# Patient Record
Sex: MALE | Race: WHITE | Employment: OTHER | URBAN - METROPOLITAN AREA
[De-identification: names, ages, dates, MRNs, and addresses within clinical notes are randomized per-mention and may not be internally consistent; named-entity substitution may affect disease eponyms.]

---

## 2021-09-16 ENCOUNTER — HOSPITAL ENCOUNTER (OUTPATIENT)
Dept: PHYSICAL THERAPY | Age: 81
Discharge: HOME | End: 2021-09-16
Payer: MEDICARE

## 2021-09-16 DIAGNOSIS — M75.102 NON-TRAUMATIC ROTATOR CUFF TEAR, LEFT: ICD-10-CM

## 2021-09-16 PROCEDURE — 97110 THERAPEUTIC EXERCISES: CPT

## 2021-09-16 PROCEDURE — 97161 PT EVAL LOW COMPLEX 20 MIN: CPT

## 2021-09-16 NOTE — THERAPY EVALUATION
James Willson  : 1940  Primary: Luis Jones Medicare Advantage  Secondary:  [address] at [facility]  [address] [place], [address] [place] [address], [address]  [phone]   [phone]       OUTPATIENT PHYSICAL THERAPY:Initial Assessment 2021    ICD-10: Treatment Diagnosis: Non-traumatic rotator cuff tear, left [M75.102]                 Treatment Diagnosis 2: Left shoulder pain [M25.512]                 Treatment Diagnosis 3: Muscle weakness [M62.81]   Precautions: Hypertension  Allergies: Patient has no known allergies. TREATMENT PLAN:  Effective Dates: 2021 TO 2021 (60 days). Frequency/Duration: 1 time a week for 60 Day(s) MEDICAL/REFERRING DIAGNOSIS:  Non-traumatic rotator cuff tear, left [M75.102]   DATE OF ONSET: 2021   REFERRING PHYSICIAN: Santi Mckeon MD MD Orders: Evaluate and Treat   Return MD Appointment: None scheduled      INITIAL ASSESSMENT:  Mr. Marcus Li is a 80 y.o. male presenting to physical therapy with complaints of left shoulder pain first beginning in 2019 after experiencing bicep rupture and complete rotator cuff tear. He was a surgical candidate but reports his surgery was cancelled because he was still able to function fully without pain or limitations. Over the last couple months he reports the pain has worsened dramatically and he can no longer golf, perform daily activities or even sleep without pain. He has received an injection to his shoulder recently which has decreased his pain significantly. His main goals are to return to golfing, sleeping, and daily life without pain and to avoid surgery. Patient presents with increased pain, decreased strength, decreased ROM, decreased flexibility, impaired posture, impaired overhead reach, impaired transfer ability, decreased activity tolerance, and overall impaired functional mobility.  Patient is a good candidate for skilled physical therapy interventions to include manual therapy, therapeutic exercise, balance training, gait training, transfer training, postural re-education, body mechanics training, and pain modalities as needed. PROBLEM LIST (Impacting functional limitations):  1. Decreased Strength  2. Decreased ADL/Functional Activities  3. Increased Pain  4. Decreased Activity Tolerance  5. Decreased Flexibility/Joint Mobility  6. Decreased [place] with Home Exercise Program INTERVENTIONS PLANNED: (Treatment may consist of any combination of the following)  1. Cold  2. Cryotherapy  3. Family Education  4. Heat  5. Home Exercise Program (HEP)  6. Manual Therapy  7. Neuromuscular Re-education/Strengthening  8. Range of Motion (ROM)  9. Therapeutic Activites  10. Therapeutic Exercise/Strengthening  11. Transfer Training     GOALS: (Goals have been discussed and agreed upon with patient.)  Short-Term Functional Goals: Time Frame: 9/16/2021 to 10/14/2021  1. Patient demonstrates independence with home exercise program without verbal cueing provided by therapist.   2. Patient will sleep undisturbed due to pain. 3. Patient will demonstrate improved upright posture with scapular awareness. Discharge Goals: Time Frame: 9/16/2021 to 11/16/2021  1. Patient will demonstrate L shoulder external and internal rotation equal to the R.  2. Patient will demonstrate L shoulder ER strength of 4+/5 or better without pain. 3. Patient will return to sleeping and performing daily activities without pain. 4. Patient will return to golf with only mild pain and modifications. 5. Patient will improve Disability of the Arm, Shoulder, and Hand score to 16/55 from 54/55. Outcome Measure: Tool Used: Disabilities of the Arm, Shoulder and Hand (DASH) Questionnaire - Quick Version  Score:  Initial: 34/55  Most Recent: X/55 (Date: -- )   Interpretation of Score: The DASH is designed to measure the activities of daily living in person's with upper extremity dysfunction or pain. Each section is scored on a 1-5 scale, 5 representing the greatest disability. The scores of each section are added together for a total score of 55. Medical Necessity:   · Patient is expected to demonstrate progress in strength, range of motion and functional technique to decrease pain and improve ability to perform ADLs. · Skilled intervention continues to be required due to need for carefully progressed exercise program tailored to patient impairments. Reason for Services/Other Comments:  · Patient continues to require skilled intervention due to need for carefully progressed exercise program, manual therapy and plan of care adjustments tailored to patient needs. Total Evaluation Duration: 25 minutes    Rehabilitation Potential For Stated Goals: Good  Regarding Vane Leigh [facility]'s therapy, I certify that the treatment plan above will be carried out by a therapist or under their direction. Thank you for this referral,  Jen Caceres PT     Referring Physician Signature: Estela Bhatti MD _______________________________ Date _____________             PAIN/SUBJECTIVE:    Initial: Pain Intensity 1: 6  Pain Location 1: Shoulder  Pain Orientation 1: Left, Posterior, Anterior, Lateral  Post Session:  6/10    HISTORY:    History of Injury/Illness (Reason for Referral):  Mr. Vignesh Pete is a 80 y.o. male presenting to physical therapy with complaints of left shoulder pain first beginning in 2019 after experiencing bicep rupture and complete rotator cuff tear. He was a surgical candidate but reports his surgery was cancelled because he was still able to function fully without pain or limitations. Over the last couple months he reports the pain has worsened dramatically and he can no longer golf, perform daily activities or even sleep without pain. He has received an injection to his shoulder recently which has decreased his pain significantly.  His main goals are to return to golfing, sleeping, and daily life without pain and to avoid surgery. Patient presents with increased pain, decreased strength, decreased ROM, decreased flexibility, impaired posture, impaired overhead reach, impaired transfer ability, decreased activity tolerance, and overall impaired functional mobility. Patient is a good candidate for skilled physical therapy interventions to include manual therapy, therapeutic exercise, balance training, gait training, transfer training, postural re-education, body mechanics training, and pain modalities as needed. Past Medical History/Comorbidities:   Mr. Alysa Ryan  has no past medical or surgical history on file and did not report any in his paperwork with the exception of hypertension, difficulty sleeping, shoulder pain and headaches. Social History/Living Environment:    Patient is  and lives alone in a private home. 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 He enjoyed playing golf up until a few months ago when shoulder pain worsened. Dominant Side:         RIGHT    Ambulatory/Rehab Services H2 Model Falls Risk Assessment    Risk Factors:       (1)  Gender [Male] Ability to Rise from Chair:       (1)  Pushes up, successful in one attempt    Falls Prevention Plan:       No modifications necessary    Total: (5 or greater = High Risk): 2    ©2010 [facility] of [employer]. All Rights Reserved. [facility] Patent #[id number]. Federal Law prohibits the replication, distribution or use without written permission from [facility] [place]    Current Medications:        Current Outpatient Medications: (Patient reports he is not taking any medications 09/17/2021)     topiramate (TOPAMAX) 25 mg tablet, Take 75 mg by mouth., Disp: , Rfl:     potassium chloride SR (KLOR-CON 10) 10 mEq tablet, Take 10 mEq by mouth daily. , Disp: , Rfl:     lisinopril-hydroCHLOROthiazide (PRINZIDE, ZESTORETIC) 20-12.5 mg per tablet, Take 1 Tablet by mouth daily. , Disp: , Rfl:     aspirin (ASPIRIN) 325 mg tablet, Take 325 mg by mouth daily. , Disp: , Rfl:     HYZAAR 100-25 mg Tab, take 1 Tab by mouth daily. , Disp: , Rfl:     INDERAL LA 60 mg Cs24, take 60 mg by mouth daily. , Disp: , Rfl:     ADVIL PO, take 2 Tabs by mouth as needed. , Disp: , Rfl:     Date Last Reviewed:  9/16/2021    Number of Personal Factors/Comorbidities that affect the Plan of Care:   0: LOW COMPLEXITY    EXAMINATION:    Patient denies any headaches, changes in vision, dizziness, vertigo, nausea, drop attacks, black outs, tinnitus, dysphagia, dysarthria, LE symptoms or bowel/bladder dysfunction. Observation/Orthostatic Postural Assessment:          Patient demonstrating no significant postural deviations; demonstrating \"pop eye sign\" of L biceps muscle belly. Palpation:          Patient is mildly tender to palpation over L proximal biceps tendon. ROM:    AROM/ PROM Left (degrees) Right (degrees)   Shoulder Flexion 180 180   Shoulder Abduction 180 180   Shoulder Internal Rotation  NT NT   Functional Internal Rotation WNL WNL   Shoulder External Rotation 45 NT   Functional External Rotation WNL WNL     Strength: Motion Tested Left   (*/5) Right  (*/5)   Shoulder Flexion 5 5   Scaption 5 5   Shoulder Abduction 5 5   Shoulder Internal Rotation  5 5   Shoulder External Rotation 4- (painful) 5   Elbow Flexion 5 5   Elbow Extension 5 5   Wrist Flexion 5 5   Wrist Extension 5 5    (in lbs): arm at side with elbow flexed to 90 degrees       Special Tests:    Impingement tests:  Caceres-Dunning test: Positive  Neer test: Positive  Painful arc: Positive  Rotator Cuff:  Lift off test: Negative  Drop sign: Negative   Biceps brachii tests:  Speed's test: Negative     Passive Accessory Motion:         Patient is guarded to PROM L shoulder especially into internal and external rotation.      Neurological Screen:              Myotomes: Key muscle strength testing through bilateral UE is intact. Dermatomes: Sensation to light touch for bilateral UE is intact. Functional Mobility:   Patient ambulates and performs transfers independently without difficulty. Balance:          WNL based on patient history and observation of functional mobility. Body Structures Involved:  1. Joints  2. Muscles  3. Ligaments Body Functions Affected:  1. Sensory/Pain  2. Neuromusculoskeletal  3. Movement Related Activities and Participation Affected:  1. General Tasks and Demands  2. Mobility  3. Self Care  4. Domestic Life  5.  Community, Social and [place]    Number of elements (examined above) that affect the Plan of Care: 1-2: LOW COMPLEXITY    CLINICAL PRESENTATION:    Presentation:   Stable and uncomplicated: LOW COMPLEXITY    CLINICAL DECISION MAKING:    Use of outcome tool(s) and clinical judgement create a POC that gives a: Clear prediction of patient's progress: LOW COMPLEXITY

## 2021-09-16 NOTE — PROGRESS NOTES
Reyna Clarke  : 1940  Primary: Mercedes Mayorgamarko Medicare Advantage  Secondary:  [address] at [facility]  [address], [address] [place], [place], [address] [place] [address] Street  [phone]   [phone]      OUTPATIENT PHYSICAL THERAPY: Daily Treatment Note 2021    ICD-10: Treatment Diagnosis: Non-traumatic rotator cuff tear, left [M75.102]                 Treatment Diagnosis 2: Left shoulder pain [M25.512]                 Treatment Diagnosis 3: Muscle weakness [M62.81]   Precautions: Hypertension  Allergies: Patient has no known allergies. TREATMENT PLAN:  Effective Dates: 2021 TO 2021 (60 days). Frequency/Duration: 1 time a week for 60 Day(s) MEDICAL/REFERRING DIAGNOSIS:  Non-traumatic rotator cuff tear, left [M75.102]   DATE OF ONSET: 2021   REFERRING PHYSICIAN: Katie Centeno MD MD Orders: Evaluate and Treat   Return MD Appointment: None scheduled      Pre-treatment Symptoms/Complaints: It's so bad I can't sleep. Pain: Initial: Pain Intensity 1: 6  Pain Location 1: Shoulder  Pain Orientation 1: Left, Posterior, Anterior, Lateral  Post Session:  6/10   Medications Last Reviewed:  2021  Updated Objective Findings:  See evaluation note from today   TREATMENT:   THERAPEUTIC EXERCISE: (30 minutes):  Exercises per grid below to improve mobility, strength and coordination. Required mod visual, verbal, manual and tactile cues to promote proper body alignment, promote proper body posture, promote proper body mechanics and promote proper body breathing techniques. Progressed resistance, range, repetitions and complexity of movement as indicated.      Date:  2021 Date:   Date:     Activity/Exercise Parameters Parameters Parameters   Shoulder Internal Rotation  3 x 10 Green      Shoulder External Rotation  3 x 10 Red      Shoulder IR/ER Walkout  3 x 10 Green      Doorway Stretch  4 x 30 sec                          Time spent with patient reviewing proper muscle recruitment and technique with exercises. MANUAL THERAPY: (0 minutes): Soft tissue mobilization was utilized and necessary because of the patient's loss of articular motion   None today     MODALITIES: (0 minutes):      None today     HEP: As above; handouts given to patient for all exercises. Treatment/Session Summary:    · Response to Treatment:  Patient reported no increased pain following session. .  · Communication/Consultation:  Education on rationale for exercises and physical therapy as a means for addressing dysfunction   · Equipment provided today:  HEP handout; therabands   · Recommendations/Intent for next treatment session: Next visit will focus on exercise progressions, manual therapy as indicated.     Total Treatment Billable Duration:  55 minutes: 25 minutes evaluation, 30 minutes therapeutic exercise   PT Patient Time In/Time Out  Time In: 0930  Time Out: NATALY House

## 2021-09-21 ENCOUNTER — HOSPITAL ENCOUNTER (OUTPATIENT)
Dept: PHYSICAL THERAPY | Age: 81
Discharge: HOME | End: 2021-09-21
Payer: MEDICARE

## 2021-09-22 NOTE — PROGRESS NOTES
Physical Therapy  [address] [facility] at [facility] 9/22/2021     Patient requesting to cancel today's appointment; no reason given.      Eugene Zee PT, DPT

## 2021-09-28 ENCOUNTER — HOSPITAL ENCOUNTER (OUTPATIENT)
Dept: PHYSICAL THERAPY | Age: 81
Discharge: HOME | End: 2021-09-28
Payer: MEDICARE

## 2021-09-28 PROCEDURE — 97140 MANUAL THERAPY 1/> REGIONS: CPT

## 2021-09-28 PROCEDURE — 97110 THERAPEUTIC EXERCISES: CPT

## 2021-09-28 NOTE — PROGRESS NOTES
Dalton Bower  : 1940  Primary: Betty Floor Medicare Advantage  Secondary:  [address] at [facility]  [address] [place], [address], [place], [address] [place] [address] Street  [phone]   [phone]      OUTPATIENT PHYSICAL THERAPY: Daily Treatment Note 2021    ICD-10: Treatment Diagnosis: Non-traumatic rotator cuff tear, left [M75.102]                 Treatment Diagnosis 2: Left shoulder pain [M25.512]                 Treatment Diagnosis 3: Muscle weakness [M62.81]   Precautions: Hypertension  Allergies: Patient has no known allergies. TREATMENT PLAN:  Effective Dates: 2021 TO 2021 (60 days). Frequency/Duration: 1 time a week for 60 Day(s) MEDICAL/REFERRING DIAGNOSIS:  Non-traumatic rotator cuff tear, left [M75.102]   DATE OF ONSET: 2021   REFERRING PHYSICIAN: Malen Cabot, MD MD Orders: Evaluate and Treat   Return MD Appointment: None scheduled      Pre-treatment Symptoms/Complaints: Patient reports feeling about the same overall but not doing his exercises every day. He reports pain with overhead movement only when holding a weight today. Pain: Initial: Pain Intensity 1: 0  Pain Location 1: Shoulder  Pain Orientation 1: Left, Posterior, Anterior  Post Session:  0/10   Medications Last Reviewed:  2021  Updated Objective Findings:  None today   TREATMENT:   THERAPEUTIC EXERCISE: (45 minutes):  Exercises per grid below to improve mobility, strength and coordination. Required mod visual, verbal, manual and tactile cues to promote proper body alignment, promote proper body posture, promote proper body mechanics and promote proper body breathing techniques. Progressed resistance, range, repetitions and complexity of movement as indicated.      Date:  2021 Date:  2021  Date:     Activity/Exercise Parameters Parameters Parameters   Shoulder Internal Rotation  3 x 10 Green  3 x 10 Green    Shoulder External Rotation  3 x 10 Red  3 x 10 Red; painful today    Shoulder IR/ER Walkout  3 x 10 Green  1 x 15 red ER; painful today    Doorway Stretch  4 x 30 sec  4 x 30 sec     Pulley   2 minutes scaption     Ladder   1 x 10 10 sec     Row  3 x 10 Black    Latt Pulldown  3 x 10 Blue     UBE  5 minutes level 1.5-4    Shoulder IR Stretch   4 x 30 sec strap    Shoulder ER isometric   1 x 5 5 sec holds (painful today)             Time spent with patient reviewing proper muscle recruitment and technique with exercises. MANUAL THERAPY: (8 minutes): Soft tissue mobilization was utilized and necessary because of the patient's loss of articular motion   PROM into all planes with gentle oscillations to decrease pain and tension and improve ROM. MODALITIES: (0 minutes):      None today     HEP: As above; handouts given to patient for all exercises. Treatment/Session Summary:    · Response to Treatment:  Patient tolerated exercises well except for shoulder ER exercises which will be held at this time. .  · Communication/Consultation:  Education on rationale for exercises and physical therapy as a means for addressing dysfunction   · Equipment provided today:  Pulleys;black and blue theraband   · Recommendations/Intent for next treatment session: Next visit will focus on exercise progressions, manual therapy as indicated.     Total Treatment Billable Duration:  53 minutes   PT Patient Time In/Time Out  Time In: 1005  Time Out: [facility], PT

## 2021-10-05 ENCOUNTER — HOSPITAL ENCOUNTER (OUTPATIENT)
Dept: PHYSICAL THERAPY | Age: 81
Discharge: HOME | End: 2021-10-05
Payer: MEDICARE

## 2021-10-05 NOTE — PROGRESS NOTES
Physical Therapy  Leah Gagnon at [facility] 10/5/2021     Patient requesting to cancel today's appointment; no reason given.      Honora Medico PT, DPT

## 2021-10-12 ENCOUNTER — HOSPITAL ENCOUNTER (OUTPATIENT)
Dept: PHYSICAL THERAPY | Age: 81
Discharge: HOME | End: 2021-10-12
Payer: MEDICARE

## 2021-10-19 ENCOUNTER — HOSPITAL ENCOUNTER (OUTPATIENT)
Dept: PHYSICAL THERAPY | Age: 81
Discharge: HOME | End: 2021-10-19
Payer: MEDICARE

## 2021-10-19 PROCEDURE — 97110 THERAPEUTIC EXERCISES: CPT

## 2021-10-19 NOTE — PROGRESS NOTES
Outpatient PHYSICAL THERAPY: PHYSICIAN COMMUNICATION    REFERRING PHYSICIAN: Dhaval Rivas MD  Return Physician Appointment: 10/29/2021  MEDICAL/REFERRING DIAGNOSIS:  · Unspecified rotator cuff tear or rupture of left shoulder, not specified as traumatic [M75.102]  ATTENDANCE: Misty Turcios has attended 3 out of 6 visits, with 3 cancellation(s) and 0 no shows. ASSESSMENT:  DATE: 10/19/2021    PROGRESS: Misty Turcios has been seen for 3 sessions of physical therapy since 09/16/2021 with 3 cancellations. He reports he was doing about the same but doing his program intermittently until a couple weeks ago when he felt his shoulder tear more while working on his car. He has requested to be placed on hold until following up with Dr. Iris Inman. He is now unable to lift his arm actively above 90 degrees in sitting.     RECOMMENDATIONS: Follow up with Dr. Iris Inman     Thank you for this referral,  Zulma Cleaning, PT, DPT

## 2021-10-19 NOTE — PROGRESS NOTES
Cristina Amos  : 1940  Primary: Yoel Lyons Medicare Advantage  Secondary:  [address] at [facility]  [address] [place], [address] [facility], [address]  [phone]   [phone]      OUTPATIENT PHYSICAL THERAPY: Daily Treatment Note 10/19/2021    ICD-10: Treatment Diagnosis: Non-traumatic rotator cuff tear, left [M75.102]                 Treatment Diagnosis 2: Left shoulder pain [M25.512]                 Treatment Diagnosis 3: Muscle weakness [M62.81]   Precautions: Hypertension  Allergies: Patient has no known allergies. TREATMENT PLAN:  Effective Dates: 2021 TO 2021 (60 days). Frequency/Duration: 1 time a week for 60 Day(s) MEDICAL/REFERRING DIAGNOSIS:  Non-traumatic rotator cuff tear, left [M75.102]   DATE OF ONSET: 2021   REFERRING PHYSICIAN: Betina López MD MD Orders: Evaluate and Treat   Return MD Appointment: None scheduled      Pre-treatment Symptoms/Complaints: Patient reports feeling worse after feeling his shoulder tearing more while working on his car a couple weeks ago. Pain: Initial: Pain Intensity 1: 0  Pain Location 1: Shoulder  Pain Orientation 1: Left, Posterior, Anterior  Post Session:  0/10   Medications Last Reviewed:  10/19/2021  Updated Objective Findings:  Patient can only elevate arm to 90 degrees in sitting today. TREATMENT:   THERAPEUTIC EXERCISE: (53 minutes):  Exercises per grid below to improve mobility, strength and coordination. Required mod visual, verbal, manual and tactile cues to promote proper body alignment, promote proper body posture, promote proper body mechanics and promote proper body breathing techniques. Progressed resistance, range, repetitions and complexity of movement as indicated.      Date:  2021 Date:  2021  Date:  10/19/2021   Activity/Exercise Parameters Parameters Parameters   Shoulder Internal Rotation  3 x 10 Green  3 x 10 Green ---   Shoulder External Rotation 3 x 10 Red  3 x 10 Red; painful today ---   Shoulder IR/ER Walkout  3 x 10 Green  1 x 15 red ER; painful today 1 x 15 Green IR only    Doorway Stretch  4 x 30 sec  4 x 30 sec     Pulley   2 minutes scaption  4 minutes scaption    Ladder   1 x 10 10 sec  1 x 20    Row  3 x 10 Black 3 x 10 Black    Latt Pulldown  3 x 10 Blue  3 x 10 Blue    UBE  5 minutes level 1.5-4 6 minutes level 1    Shoulder IR Stretch   4 x 30 sec strap ---   Shoulder ER isometric   1 x 5 5 sec holds (painful today)  ---   Cane Exercises    Abduction, external rotation and flexion 1 x 20 each semi supine     1 x 20 abduction in sitting      Time spent with patient reviewing proper muscle recruitment and technique with exercises. MANUAL THERAPY: (0 minutes none today): Soft tissue mobilization was utilized and necessary because of the patient's loss of articular motion   PROM into all planes with gentle oscillations to decrease pain and tension and improve ROM. MODALITIES: (0 minutes):      None today     HEP: As above; handouts given to patient for all exercises. Treatment/Session Summary:    · Response to Treatment:  Patient tolerated modified session well. · Communication/Consultation:  Education on rationale for exercises and physical therapy as a means for addressing dysfunction   · Equipment provided today:  Physician note  · Recommendations/Intent for next treatment session: Next visit will focus on exercise progressions, manual therapy as indicated.     Total Treatment Billable Duration:  55 minutes   PT Patient Time In/Time Out  Time In: 0903  Time Out: 0957  Eugene Zee, PT

## 2021-10-26 ENCOUNTER — HOSPITAL ENCOUNTER (OUTPATIENT)
Dept: PHYSICAL THERAPY | Age: 81
Discharge: HOME | End: 2021-10-26
Payer: MEDICARE

## 2021-10-26 NOTE — PROGRESS NOTES
Physical Therapy  [address] [facility] at [place] 10/26/2021     No appointments this week; patient will be on hold until follow up with physician.      Larisa Lara PT, DPT

## 2022-05-17 NOTE — THERAPY DISCHARGE
[facility]  : 1940  Primary: Paw[employer]or [employer]vantage  Secondary:  [address] at [facility]  [address], [address] [place] [address], [address]  [phone]   [phone]       OUTPATIENT PHYSICAL THERAPY:Discontinuation Summary 2022    ICD-10: Treatment Diagnosis: Non-traumatic rotator cuff tear, left [M75.102]                 Treatment Diagnosis 2: Left shoulder pain [M25.512]                 Treatment Diagnosis 3: Muscle weakness [M62.81]   Precautions: Hypertension  Allergies: Patient has no known allergies. TREATMENT PLAN:  Effective Dates: 2021 TO 2021 (60 days). Frequency/Duration: 1 time a week for 60 Day(s) MEDICAL/REFERRING DIAGNOSIS:  Unspecified rotator cuff tear or rupture of left shoulder, not specified as traumatic [M75.102]   DATE OF ONSET: 2021   REFERRING PHYSICIAN: Dhaval Rivas MD MD Orders: Evaluate and Treat   Return MD Appointment: None scheduled      INITIAL ASSESSMENT:  Mr. Kevin Watts is a [de-identified] y.o. male presenting to physical therapy with complaints of left shoulder pain first beginning in 2019 after experiencing bicep rupture and complete rotator cuff tear. He was a surgical candidate but reports his surgery was cancelled because he was still able to function fully without pain or limitations. Over the last couple months he reports the pain has worsened dramatically and he can no longer golf, perform daily activities or even sleep without pain. He has received an injection to his shoulder recently which has decreased his pain significantly. His main goals are to return to golfing, sleeping, and daily life without pain and to avoid surgery. Patient presents with increased pain, decreased strength, decreased ROM, decreased flexibility, impaired posture, impaired overhead reach, impaired transfer ability, decreased activity tolerance, and overall impaired functional mobility.  Patient is a good candidate for skilled physical therapy interventions to include manual therapy, therapeutic exercise, balance training, gait training, transfer training, postural re-education, body mechanics training, and pain modalities as needed. DISCONTINUATION SUMMARY 05/17/22: Mr. Dewey Heath was seen for three sessions of PT from 09/16/2021 to 10/19/2021 with limited success. He was placed on hold and did not return for subsequent visits so goals unable to be reassessed; patient will be discharged at this time for this reason. PROBLEM LIST (Impacting functional limitations):  1. Decreased Strength  2. Decreased ADL/Functional Activities  3. Increased Pain  4. Decreased Activity Tolerance  5. Decreased Flexibility/Joint Mobility  6. Decreased [place] with Home Exercise Program INTERVENTIONS PLANNED: (Treatment may consist of any combination of the following)  1. Cold  2. Cryotherapy  3. Family Education  4. Heat  5. Home Exercise Program (HEP)  6. Manual Therapy  7. Neuromuscular Re-education/Strengthening  8. Range of Motion (ROM)  9. Therapeutic Activites  10. Therapeutic Exercise/Strengthening  11. Transfer Training     GOALS: (Goals have been discussed and agreed upon with patient.)  Short-Term Functional Goals: Time Frame: 9/16/2021 to 10/14/2021  1. Patient demonstrates independence with home exercise program without verbal cueing provided by therapist. (NOT MET)  2. Patient will sleep undisturbed due to pain. (NOT MET)  3. Patient will demonstrate improved upright posture with scapular awareness. (NOT MET)  Discharge Goals: Time Frame: 9/16/2021 to 11/16/2021  1. Patient will demonstrate L shoulder external and internal rotation equal to the R. (NOT MET)  2. Patient will demonstrate L shoulder ER strength of 4+/5 or better without pain. (NOT MET)  3. Patient will return to sleeping and performing daily activities without pain. (NOT MET)  4.  Patient will return to golf with only mild pain and modifications. (NOT MET)  5. Patient will improve Disability of the Arm, Shoulder, and Hand score to 16/55 from 54/55. (NOT MET)     Outcome Measure: Tool Used: Disabilities of the Arm, Shoulder and Hand (DASH) Questionnaire - Quick Version  Score:  Initial: 34/55  Most Recent: X/55 (Date: -- )   Interpretation of Score: The DASH is designed to measure the activities of daily living in person's with upper extremity dysfunction or pain. Each section is scored on a 1-5 scale, 5 representing the greatest disability. The scores of each section are added together for a total score of 55. Patient denies any headaches, changes in vision, dizziness, vertigo, nausea, drop attacks, black outs, tinnitus, dysphagia, dysarthria, LE symptoms or bowel/bladder dysfunction. Observation/Orthostatic Postural Assessment:          Patient demonstrating no significant postural deviations; demonstrating \"pop eye sign\" of L biceps muscle belly. Palpation:          Patient is mildly tender to palpation over L proximal biceps tendon. ROM:    AROM/ PROM Left (degrees) Right (degrees)   Shoulder Flexion 180 180   Shoulder Abduction 180 180   Shoulder Internal Rotation  NT NT   Functional Internal Rotation WNL WNL   Shoulder External Rotation 45 NT   Functional External Rotation WNL WNL     Strength:     Motion Tested Left   (*/5) Right  (*/5)   Shoulder Flexion 5 5   Scaption 5 5   Shoulder Abduction 5 5   Shoulder Internal Rotation  5 5   Shoulder External Rotation 4- (painful) 5   Elbow Flexion 5 5   Elbow Extension 5 5   Wrist Flexion 5 5   Wrist Extension 5 5    (in lbs): arm at side with elbow flexed to 90 degrees       Special Tests:    Impingement tests:  Caceres-Northfield test: Positive  Neer test: Positive  Painful arc: Positive  Rotator Cuff:  Lift off test: Negative  Drop sign: Negative   Biceps brachii tests:  Speed's test: Negative     Passive Accessory Motion:         Patient is guarded to PROM L shoulder especially into internal and external rotation. Neurological Screen:              Myotomes: Key muscle strength testing through bilateral UE is intact. Dermatomes: Sensation to light touch for bilateral UE is intact. Functional Mobility:   Patient ambulates and performs transfers independently without difficulty. Balance:          WNL based on patient history and observation of functional mobility. Medical Necessity:   · Patient is expected to demonstrate progress in strength, range of motion and functional technique to decrease pain and improve ability to perform ADLs. · Skilled intervention continues to be required due to need for carefully progressed exercise program tailored to patient impairments. Reason for Services/Other Comments:  · Patient continues to require skilled intervention due to need for carefully progressed exercise program, manual therapy and plan of care adjustments tailored to patient needs. Rehabilitation Potential For Stated Goals: Good  Regarding Ronald Koyanagi Hollingsworth's therapy, I certify that the treatment plan above will be carried out by a therapist or under their direction. Thank you for this referral,  Fidel Fernandes PT     Referring Physician Signature: Pete Fitzgerald MD No Signature is Required for this note.